# Patient Record
Sex: MALE | URBAN - METROPOLITAN AREA
[De-identification: names, ages, dates, MRNs, and addresses within clinical notes are randomized per-mention and may not be internally consistent; named-entity substitution may affect disease eponyms.]

---

## 2018-01-25 ENCOUNTER — IMPORTED ENCOUNTER (OUTPATIENT)
Dept: URBAN - METROPOLITAN AREA CLINIC 1 | Facility: CLINIC | Age: 43
End: 2018-01-25

## 2018-01-25 PROBLEM — H11.153: Noted: 2018-01-25

## 2018-01-25 PROCEDURE — 92015 DETERMINE REFRACTIVE STATE: CPT

## 2018-01-25 PROCEDURE — 92004 COMPRE OPH EXAM NEW PT 1/>: CPT

## 2018-01-25 NOTE — PATIENT DISCUSSION
1.  Pinguecula OU -- Use of sunglasses when exposed to UV light and observation is recommended. Recommend ATs BID-QID OU routinely (sample of Systane Ultra given). MRX for glasses givenReturn for an appointment in 1 year 27 with Dr. Jacoby Solis.

## 2022-04-02 ASSESSMENT — TONOMETRY
OS_IOP_MMHG: 18
OD_IOP_MMHG: 18

## 2022-04-02 ASSESSMENT — VISUAL ACUITY
OS_CC: 20/20
OS_SC: J1
OD_SC: J1
OD_CC: 20/20